# Patient Record
Sex: FEMALE | NOT HISPANIC OR LATINO | URBAN - METROPOLITAN AREA
[De-identification: names, ages, dates, MRNs, and addresses within clinical notes are randomized per-mention and may not be internally consistent; named-entity substitution may affect disease eponyms.]

---

## 2020-01-17 ENCOUNTER — OFFICE VISIT (OUTPATIENT)
Dept: URBAN - METROPOLITAN AREA CLINIC 52 | Facility: CLINIC | Age: 80
End: 2020-01-17
Payer: MEDICARE

## 2020-01-17 DIAGNOSIS — H53.122 TRANSIENT VISUAL LOSS, LEFT EYE: Primary | ICD-10-CM

## 2020-01-17 PROCEDURE — 92134 CPTRZ OPH DX IMG PST SGM RTA: CPT | Performed by: OPHTHALMOLOGY

## 2020-01-17 PROCEDURE — 92004 COMPRE OPH EXAM NEW PT 1/>: CPT | Performed by: OPHTHALMOLOGY

## 2020-01-17 PROCEDURE — 92014 COMPRE OPH EXAM EST PT 1/>: CPT | Performed by: OPHTHALMOLOGY

## 2020-01-17 ASSESSMENT — INTRAOCULAR PRESSURE
OD: 16
OS: 20
OD: 19
OS: 16

## 2020-01-17 NOTE — IMPRESSION/PLAN
Impression: Transient visual loss, left eye: H53.122. Plan: Possible TIA, called Dr. Mavis Johnson office, they are closed. It did transfer me to Dr Gelacio Argueta, he advised us to send pt to ER or urgent care if pt was having current issues.  Will send pt to ER with daughter with chart notes and send notes to PCP